# Patient Record
Sex: FEMALE | Race: WHITE | Employment: FULL TIME | URBAN - METROPOLITAN AREA
[De-identification: names, ages, dates, MRNs, and addresses within clinical notes are randomized per-mention and may not be internally consistent; named-entity substitution may affect disease eponyms.]

---

## 2020-05-08 ENCOUNTER — HOSPITAL ENCOUNTER (EMERGENCY)
Facility: CLINIC | Age: 30
Discharge: HOME | End: 2020-05-08
Attending: NURSE PRACTITIONER | Admitting: NURSE PRACTITIONER

## 2020-05-08 VITALS
TEMPERATURE: 98.2 F | DIASTOLIC BLOOD PRESSURE: 81 MMHG | SYSTOLIC BLOOD PRESSURE: 138 MMHG | RESPIRATION RATE: 18 BRPM | OXYGEN SATURATION: 100 % | HEART RATE: 98 BPM
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
## 2020-05-08 PROCEDURE — 99283 EMERGENCY DEPT VISIT LOW MDM: CPT | Mod: 25 | Performed by: NURSE PRACTITIONER

## 2020-05-08 PROCEDURE — 25000128 H RX IP 250 OP 636: Performed by: NURSE PRACTITIONER

## 2020-05-08 PROCEDURE — 90471 IMMUNIZATION ADMIN: CPT | Performed by: NURSE PRACTITIONER

## 2020-05-08 PROCEDURE — 12032 INTMD RPR S/A/T/EXT 2.6-7.5: CPT | Mod: Z6 | Performed by: NURSE PRACTITIONER

## 2020-05-08 PROCEDURE — 90715 TDAP VACCINE 7 YRS/> IM: CPT | Performed by: NURSE PRACTITIONER

## 2020-05-08 PROCEDURE — 99284 EMERGENCY DEPT VISIT MOD MDM: CPT | Mod: 25 | Performed by: NURSE PRACTITIONER

## 2020-05-08 PROCEDURE — 12032 INTMD RPR S/A/T/EXT 2.6-7.5: CPT | Performed by: NURSE PRACTITIONER

## 2020-05-08 RX ORDER — LIDOCAINE HYDROCHLORIDE AND EPINEPHRINE 10; 10 MG/ML; UG/ML
15 INJECTION, SOLUTION INFILTRATION; PERINEURAL ONCE
Status: COMPLETED | OUTPATIENT
Start: 2020-05-08 | End: 2020-05-08

## 2020-05-08 RX ORDER — HYDROCODONE BITARTRATE AND ACETAMINOPHEN 5; 325 MG/1; MG/1
1-2 TABLET ORAL EVERY 6 HOURS PRN
Qty: 18 TABLET | Refills: 0 | Status: SHIPPED | OUTPATIENT
Start: 2020-05-08 | End: 2020-05-26

## 2020-05-08 RX ADMIN — LIDOCAINE HYDROCHLORIDE AND EPINEPHRINE 15 ML: 10; 10 INJECTION, SOLUTION INFILTRATION; PERINEURAL at 13:30

## 2020-05-08 RX ADMIN — CLOSTRIDIUM TETANI TOXOID ANTIGEN (FORMALDEHYDE INACTIVATED), CORYNEBACTERIUM DIPHTHERIAE TOXOID ANTIGEN (FORMALDEHYDE INACTIVATED), BORDETELLA PERTUSSIS TOXOID ANTIGEN (GLUTARALDEHYDE INACTIVATED), BORDETELLA PERTUSSIS FILAMENTOUS HEMAGGLUTININ ANTIGEN (FORMALDEHYDE INACTIVATED), BORDETELLA PERTUSSIS PERTACTIN ANTIGEN, AND BORDETELLA PERTUSSIS FIMBRIAE 2/3 ANTIGEN 0.5 ML: 5; 2; 2.5; 5; 3; 5 INJECTION, SUSPENSION INTRAMUSCULAR at 13:49

## 2020-05-08 NOTE — DISCHARGE INSTRUCTIONS
Start the Augmentin today and take as prescribed, I would recommend a good probiotic such as Culturelle which can be found over-the-counter to prevent stomach upset and diarrhea, make sure you do not take this on an empty stomach.  I would also strongly encourage you to take ibuprofen 600 mg every 6 hours for the next couple of days scheduled this will help with the swelling and inflammation from the dog bite.  You can also take Tylenol 650 mg every 4 hours as needed and I have given you a prescription for Norco which is a narcotic for pain.  This does contain Tylenol as well, do not exceed more than 4000 mg of Tylenol from all sources in a 24-hour period  If you were up ambulating I would recommend wearing the Ace wrap for the next 3 days.  Try to elevate your leg is much as possible, apply ice for 20 minutes every 2-3 hours.  Follow-up at your primary care clinic in the next 10 to 14 days to have your sutures removed.  Return sooner if you develop any signs of infection such as redness, heat, pus-like drainage from the wound.  Take care and I hope you have a much better weekend.

## 2020-05-08 NOTE — ED TRIAGE NOTES
Presents to ED with dog bite to left posterior lower leg. Patient's friend's dog bit her. Reports dog is up to date on shots.

## 2020-05-08 NOTE — ED PROVIDER NOTES
History     Chief Complaint   Patient presents with     Dog Bite     HPI  Deanna Zazueta is a 30 year old female who presents to the emergency room today after being bit by her friend's dog to her calf.  Patient's friend is fostering the dog, dog is up-to-date on vaccinations.  Dog has no known history of this but again the dog is a foster dog so history is unclear.  Patient reports that the bite was unprovoked.  Patient is not sure of her last tetanus status.    Allergies:  No Known Allergies    Problem List:    There are no active problems to display for this patient.       Past Medical History:    No past medical history on file.    Past Surgical History:    No past surgical history on file.    Family History:    No family history on file.    Social History:  Marital Status:   [2]  Social History     Tobacco Use     Smoking status: Not on file   Substance Use Topics     Alcohol use: Not on file     Drug use: Not on file        Medications:    amoxicillin-clavulanate (AUGMENTIN) 875-125 MG tablet  HYDROcodone-acetaminophen (NORCO) 5-325 MG tablet          Review of Systems   All other systems reviewed and are negative.      Physical Exam   BP: (!) 142/85  Pulse: 104  Temp: 98.2  F (36.8  C)  Resp: 18  SpO2: 100 %      Physical Exam  Constitutional:       Appearance: Normal appearance.   HENT:      Head: Normocephalic.      Mouth/Throat:      Mouth: Mucous membranes are moist.   Eyes:      Extraocular Movements: Extraocular movements intact.   Neck:      Musculoskeletal: Normal range of motion.   Cardiovascular:      Rate and Rhythm: Tachycardia present.      Pulses: Normal pulses.   Pulmonary:      Effort: Pulmonary effort is normal.      Breath sounds: Normal breath sounds.   Musculoskeletal: Normal range of motion.         General: Tenderness present.   Skin:     General: Skin is warm.      Capillary Refill: Capillary refill takes less than 2 seconds.      Comments: Patient has dog bite to left  calf, 4 cm largest, full-thickness bite with 2 other puncture bite wounds measuring 1.5 cm to back of calf and inferiorly to larger wound.  No evidence of tendon/ligamentous injury, CMS is intact   Neurological:      General: No focal deficit present.      Mental Status: She is alert.   Psychiatric:         Mood and Affect: Mood normal.                 ED Course        Procedures   [facility] Procedure Note        Laceration Repair:    Performed by: BETO Stephens CNP  Authorized by: BETO Stephens CNP  Consent given by: Patient who states understanding of the procedure being performed after discussing the risks, benefits and alternatives.    Preparation: Patient was prepped and draped in usual sterile fashion.  Irrigation solution: saline    Body area: left calf  Laceration length: 4cm, 1.5 cm and 1.5 cm  Contamination: The wound is not contaminated.  Foreign bodies:none  Tendon involvement: none  Anesthesia: Local  Local anesthetic: Lidocaine 1%, with epinephrine  Anesthetic total: 15 ml    Debridement: none  Skin closure: Closed with 18 x 5.0 Ethilon and with 4 x SQ 4.0 Vicryl Sutures  Technique: interrupted  Approximation: close  Approximation difficulty: intermediate (layered closure or heavily contaminated)    Patient tolerance: Patient tolerated the procedure well with no immediate complications.    No results found for this or any previous visit (from the past 24 hour(s)).    Medications   lidocaine 1% with EPINEPHrine 1:100,000 injection 15 mL (15 mLs Intradermal Given by Other 5/8/20 1330)   Tdap (tetanus-diphtheria-acell pertussis) (ADACEL) injection 0.5 mL (0.5 mLs Intramuscular Given 5/8/20 1349)       Assessments & Plan (with Medical Decision Making)  Dog bite left calf  Tetanus was updated here in the emergency room, police report is been made, see photos above.  Suture repair as noted above which patient tolerated well.  Augmentin started prophylactically, wound care  discussed in detail, patient given Norco for pain, narcotic safety discussed as well as maximum Tylenol dosing in a 24-hour period.  Rest, ice, elevation recommended, leg was wrapped with an Ace wrap for compression and to prevent suture strain.  Reasons to return to the emergency room were discussed.  Patient is agreeable to plan of care, suture removal in 10 to 14 days, patient discharged in stable condition.     I have reviewed the nursing notes.    I have reviewed the findings, diagnosis, plan and need for follow up with the patient.    New Prescriptions    AMOXICILLIN-CLAVULANATE (AUGMENTIN) 875-125 MG TABLET    Take 1 tablet by mouth 2 times daily    HYDROCODONE-ACETAMINOPHEN (NORCO) 5-325 MG TABLET    Take 1-2 tablets by mouth every 6 hours as needed for pain       Final diagnoses:   Dog bite, initial encounter       5/8/2020   [facility] EMERGENCY DEPARTMENT     Anita Orosco, BETO CNP  05/08/20 1405

## 2020-05-08 NOTE — ED AVS SNAPSHOT
[facility] Emergency Department  [address] [place] [address]  Phone:  [phone]  Fax:  [phone]                                    Deanna Zazueta   MRN: [id number]    Department:  [facility] Emergency Department   Date of Visit:  5/8/2020           After Visit Summary Signature Page    I have received my discharge instructions, and my questions have been answered. I have discussed any challenges I see with this plan with the nurse or doctor.    ..........................................................................................................................................  Patient/Patient Representative Signature      ..........................................................................................................................................  Patient Representative Print Name and Relationship to Patient    ..................................................               ................................................  Date                                   Time    ..........................................................................................................................................  Reviewed by Signature/Title    ...................................................              ..............................................  Date                                               Time          22EPIC Rev 08/18

## 2021-01-04 ENCOUNTER — HEALTH MAINTENANCE LETTER (OUTPATIENT)
Age: 31
End: 2021-01-04

## 2021-10-10 ENCOUNTER — HEALTH MAINTENANCE LETTER (OUTPATIENT)
Age: 31
End: 2021-10-10

## 2022-01-30 ENCOUNTER — HEALTH MAINTENANCE LETTER (OUTPATIENT)
Age: 32
End: 2022-01-30

## 2022-09-24 ENCOUNTER — HEALTH MAINTENANCE LETTER (OUTPATIENT)
Age: 32
End: 2022-09-24

## 2023-05-08 ENCOUNTER — HEALTH MAINTENANCE LETTER (OUTPATIENT)
Age: 33
End: 2023-05-08